# Patient Record
Sex: FEMALE | URBAN - METROPOLITAN AREA
[De-identification: names, ages, dates, MRNs, and addresses within clinical notes are randomized per-mention and may not be internally consistent; named-entity substitution may affect disease eponyms.]

---

## 2019-10-25 ENCOUNTER — APPOINTMENT (RX ONLY)
Dept: URBAN - METROPOLITAN AREA CLINIC 27 | Facility: CLINIC | Age: 39
Setting detail: DERMATOLOGY
End: 2019-10-25

## 2019-10-25 DIAGNOSIS — L71.8 OTHER ROSACEA: ICD-10-CM

## 2019-10-25 DIAGNOSIS — Z41.9 ENCOUNTER FOR PROCEDURE FOR PURPOSES OTHER THAN REMEDYING HEALTH STATE, UNSPECIFIED: ICD-10-CM

## 2019-10-25 DIAGNOSIS — L70.8 OTHER ACNE: ICD-10-CM

## 2019-10-25 PROCEDURE — ? COUNSELING

## 2019-10-25 PROCEDURE — ? PRESCRIPTION

## 2019-10-25 PROCEDURE — ? TREATMENT REGIMEN

## 2019-10-25 PROCEDURE — ? PRODUCT LINE (OFFICE PRODUCTS)

## 2019-10-25 PROCEDURE — ? RECOMMENDATIONS

## 2019-10-25 PROCEDURE — 99202 OFFICE O/P NEW SF 15 MIN: CPT

## 2019-10-25 RX ORDER — SPIRONOLACTONE 25 MG/1
TABLET, FILM COATED ORAL QHS
Qty: 30 | Refills: 3 | Status: ERX | COMMUNITY
Start: 2019-10-25

## 2019-10-25 RX ORDER — IVERMECTIN 10 MG/G
CREAM TOPICAL QHS
Qty: 1 | Refills: 6 | Status: ERX | COMMUNITY
Start: 2019-10-25

## 2019-10-25 RX ORDER — OXYMETAZOLINE HYDROCHLORIDE 10 MG/G
CREAM TOPICAL QAM
Qty: 1 | Refills: 3 | Status: ERX | COMMUNITY
Start: 2019-10-25

## 2019-10-25 RX ADMIN — IVERMECTIN: 10 CREAM TOPICAL at 15:57

## 2019-10-25 RX ADMIN — SPIRONOLACTONE: 25 TABLET, FILM COATED ORAL at 15:56

## 2019-10-25 RX ADMIN — OXYMETAZOLINE HYDROCHLORIDE: 10 CREAM TOPICAL at 15:57

## 2019-10-25 ASSESSMENT — LOCATION ZONE DERM: LOCATION ZONE: FACE

## 2019-10-25 ASSESSMENT — LOCATION DETAILED DESCRIPTION DERM
LOCATION DETAILED: RIGHT CHIN
LOCATION DETAILED: LEFT CHIN
LOCATION DETAILED: INFERIOR MID FOREHEAD

## 2019-10-25 ASSESSMENT — LOCATION SIMPLE DESCRIPTION DERM
LOCATION SIMPLE: INFERIOR FOREHEAD
LOCATION SIMPLE: CHIN

## 2019-10-25 NOTE — PROCEDURE: PRODUCT LINE (OFFICE PRODUCTS)
Product 11 Price (In Dollars - Numeric Only, No Special Characters Or $): 1.00
Product 6 Price (In Dollars - Numeric Only, No Special Characters Or $): 0.00
Product 16 Units: 0
Render Product Pricing In Note: No
Name Of Product 3: Elta MD AM Moisturizer
Name Of Product 24: EltaMD UV Replenish
Name Of Product 14: [employer]icals Metacell Moisturizer
Name Of Product 9: Elta MD Physical
Name Of Product 4: Elta MD PM Moisturizer
Name Of Product 17: EltaMD Physical
Name Of Product 1: Elta MD Facial Foaming Cleanser
Name Of Product 22: EltaMD UV Elements SPF 44
Product 26 Units: 1
Name Of Product 7: [employer] Hyaluronic Acid Intensifier
Name Of Product 12: [employer] Phyto Corrective Masque
Name Of Product 20: [employer] Metacell Renewal B3
Name Of Product 15: [employer]icals Triple Lipid Restore
Name Of Product 5: DEJ Eye Cream
Name Of Product 10: [employer] Intellishade TruPhysical SPF 45
Name Of Product 25: Heliocare Supplements
Detail Level: Zone
Name Of Product 2: Elta MD UV Clear Tinted/Nontinted
Name Of Product 18: [employer] Intellishade TruPhysical Matte SPF 45
Name Of Product 8: [employer] Discoloration Defense
Name Of Product 23: Elta MD UV Physical
Allow Plan To Count Towards E/M Coding: Yes
Name Of Product 13: [employer]ision DEJ Eye Cream
Name Of Product 26: Epionce Lytic Cleanser
Name Of Product 31: EltaMD Pure
Name Of Product 11: [employer] Phyto Corrective Gel
Name Of Product 6: [employer]icals  Phloretin Serum
Name Of Product 21: EltaMD UV Daily Tinted SPF 40
Name Of Product 19: [employer] DEJ Night Cream

## 2019-10-25 NOTE — PROCEDURE: TREATMENT REGIMEN
Initiate Treatment: -Rhofade QD\\n-Soolantra QD
Plan: -Keep RA in reserve, pt reports sensitive skin\\n-Discussed benefit of laser therapy, best to consider excel V or V beam due to deep facial veins. BBL would be more helpful for overall redness
Detail Level: Zone
Initiate Treatment: -Union 25 mg QHS, ae discussed in detail
Plan: -Keep RA in reserve

## 2019-10-25 NOTE — PROCEDURE: RECOMMENDATIONS
Recommendations (Free Text): -Consult med spa on various sun screens, she reports she does not care for tinted sunscreen or make-up on face\\n
Recommendation Preamble: The following recommendations were made during the visit:
Detail Level: Zone